# Patient Record
Sex: MALE | URBAN - METROPOLITAN AREA
[De-identification: names, ages, dates, MRNs, and addresses within clinical notes are randomized per-mention and may not be internally consistent; named-entity substitution may affect disease eponyms.]

---

## 2021-05-04 ENCOUNTER — APPOINTMENT (OUTPATIENT)
Age: 36
Setting detail: DERMATOLOGY
End: 2021-05-19

## 2021-05-04 VITALS — RESPIRATION RATE: 18 BRPM | TEMPERATURE: 98.4 F | HEIGHT: 74 IN | WEIGHT: 220 LBS

## 2021-05-04 DIAGNOSIS — L20.89 OTHER ATOPIC DERMATITIS: ICD-10-CM

## 2021-05-04 PROCEDURE — OTHER EASI CALCULATION: OTHER

## 2021-05-04 PROCEDURE — OTHER MIPS QUALITY: OTHER

## 2021-05-04 PROCEDURE — OTHER PRESCRIPTION: OTHER

## 2021-05-04 PROCEDURE — OTHER COUNSELING: OTHER

## 2021-05-04 PROCEDURE — OTHER MEDICATION COUNSELING: OTHER

## 2021-05-04 PROCEDURE — 99203 OFFICE O/P NEW LOW 30 MIN: CPT

## 2021-05-04 PROCEDURE — OTHER PRESCRIPTION MEDICATION MANAGEMENT: OTHER

## 2021-05-04 RX ORDER — PREDNISONE 10 MG/1
TABLET ORAL
Qty: 48 | Refills: 0 | Status: ERX | COMMUNITY
Start: 2021-05-04

## 2021-05-04 RX ORDER — CLOBETASOL PROPIONATE 0.5 MG/G
CREAM TOPICAL
Qty: 1 | Refills: 3 | Status: ERX | COMMUNITY
Start: 2021-05-04

## 2021-05-04 ASSESSMENT — LOCATION ZONE DERM
LOCATION ZONE: ARM
LOCATION ZONE: NECK
LOCATION ZONE: HAND
LOCATION ZONE: LEG
LOCATION ZONE: TRUNK

## 2021-05-04 ASSESSMENT — LOCATION DETAILED DESCRIPTION DERM
LOCATION DETAILED: RIGHT PROXIMAL PRETIBIAL REGION
LOCATION DETAILED: LEFT DISTAL DORSAL FOREARM
LOCATION DETAILED: LEFT ANKLE
LOCATION DETAILED: RIGHT ANTERIOR PROXIMAL THIGH
LOCATION DETAILED: LEFT INFERIOR MEDIAL UPPER BACK
LOCATION DETAILED: STERNUM
LOCATION DETAILED: LEFT PROXIMAL PRETIBIAL REGION
LOCATION DETAILED: RIGHT DISTAL DORSAL FOREARM
LOCATION DETAILED: LEFT THENAR EMINENCE
LOCATION DETAILED: LEFT ANTERIOR DISTAL THIGH
LOCATION DETAILED: RIGHT THENAR EMINENCE
LOCATION DETAILED: LEFT INFERIOR LATERAL NECK

## 2021-05-04 ASSESSMENT — LOCATION SIMPLE DESCRIPTION DERM
LOCATION SIMPLE: RIGHT FOREARM
LOCATION SIMPLE: LEFT UPPER BACK
LOCATION SIMPLE: LEFT PRETIBIAL REGION
LOCATION SIMPLE: RIGHT PRETIBIAL REGION
LOCATION SIMPLE: CHEST
LOCATION SIMPLE: LEFT THIGH
LOCATION SIMPLE: RIGHT HAND
LOCATION SIMPLE: LEFT ANKLE
LOCATION SIMPLE: LEFT HAND
LOCATION SIMPLE: LEFT ANTERIOR NECK
LOCATION SIMPLE: RIGHT THIGH
LOCATION SIMPLE: LEFT FOREARM

## 2021-05-04 NOTE — PROCEDURE: PRESCRIPTION MEDICATION MANAGEMENT
Modify Regimen: Hold Triamcinolone cream
Continue Regimen: Loratadine daily
Initiate Treatment: Clobetasol cream apply twice a day x 14 days stop x 7 days repeat\\nOTC CeraVe ,Eucerin\\nPrednisone 10mg \\n50mg x4 days \\n40mg x 4 days \\n20mg x 4 days \\n10mg x 4 days
Plan: Will rediscuss Methotrexate on return visit \\nReturn to clinic 6 weeks
Detail Level: Zone
Render In Strict Bullet Format?: No

## 2021-05-04 NOTE — PROCEDURE: MEDICATION COUNSELING
Xelcatherinez Pregnancy And Lactation Text: This medication is Pregnancy Category D and is not considered safe during pregnancy.  The risk during breast feeding is also uncertain.

## 2021-05-04 NOTE — HPI: RASH
What Type Of Note Output Would You Prefer (Optional)?: Bullet Format
How Severe Is Your Rash?: moderate
Is This A New Presentation, Or A Follow-Up?: Rash
Additional History: Child hay fever ,asthma, \\nMother history of eczema \\nTriamcinolone \\nAllergy testing years ago environmental\\nUsing clotrimazole\\nWorse with heat and outside elements

## 2021-05-04 NOTE — PROCEDURE: EASI CALCULATION
Body Surface % (Trunk To Groin): 50-69%
Age Of Patient: 7 Years or Older
Body Surface % (Lower Limbs To Buttocks): 10-29 %
Lichenification (Lower Limbs To Buttocks): Moderate
Lichenification (Upper Limbs): Mild
Excoriation (Trunk To Groin): None
Detail Level: Detailed
Body Surface % (Head): 1-9 %
Show Score For Individual Areas?: Yes

## 2021-05-04 NOTE — PROCEDURE: MEDICATION COUNSELING
Staff message received:   Eron Sanchez,     1. Can you refer Anjelica to [facility] in GYN for difficult IUD placement (see my note)     2. Can you help her mother get into my clinic, have her screened CTA head/neck and then CTA chest/abd/pelvis, and then also echocardiogram pre visit? I've asked scheduling to help .     Thanks     Lizz     Gyn referral placed. Tried to call patient. No answer. VM box full. Will try again later.        Topical Sulfur Applications Counseling: Topical Sulfur Counseling: Patient counseled that this medication may cause skin irritation or allergic reactions.  In the event of skin irritation, the patient was advised to reduce the amount of the drug applied or use it less frequently.   The patient verbalized understanding of the proper use and possible adverse effects of topical sulfur application.  All of the patient's questions and concerns were addressed.
